# Patient Record
Sex: FEMALE | Race: WHITE | URBAN - METROPOLITAN AREA
[De-identification: names, ages, dates, MRNs, and addresses within clinical notes are randomized per-mention and may not be internally consistent; named-entity substitution may affect disease eponyms.]

---

## 2019-07-08 ENCOUNTER — CARE COORDINATION (OUTPATIENT)
Dept: CARE COORDINATION | Age: 49
End: 2019-07-08

## 2019-07-08 NOTE — CARE COORDINATION
Per chart review pt no longer is seen by Provider t [facility]. Pt now seen by Dr. Gene Hammer. No further ACM outreach.     Savita SOLISN, RN Care Manager  [address] [place] [address]   [phone]